# Patient Record
Sex: MALE | Race: WHITE | Employment: FULL TIME | URBAN - METROPOLITAN AREA
[De-identification: names, ages, dates, MRNs, and addresses within clinical notes are randomized per-mention and may not be internally consistent; named-entity substitution may affect disease eponyms.]

---

## 2017-06-09 ENCOUNTER — HOSPITAL ENCOUNTER (OUTPATIENT)
Dept: GENERAL RADIOLOGY | Age: 52
Discharge: HOME | End: 2017-06-09
Attending: ORTHOPAEDIC SURGERY
Payer: OTHER MISCELLANEOUS

## 2017-06-09 DIAGNOSIS — M76.892 TENDINITIS OF LEFT KNEE: ICD-10-CM

## 2017-06-09 DIAGNOSIS — M16.12 PRIMARY OSTEOARTHRITIS OF LEFT HIP: ICD-10-CM

## 2017-06-09 PROCEDURE — 74011250636 HC RX REV CODE- 250/636: Performed by: RADIOLOGY

## 2017-06-09 PROCEDURE — 74011636320 HC RX REV CODE- 636/320: Performed by: RADIOLOGY

## 2017-06-09 PROCEDURE — 74011000250 HC RX REV CODE- 250: Performed by: RADIOLOGY

## 2017-06-09 PROCEDURE — 20610 DRAIN/INJ JOINT/BURSA W/O US: CPT

## 2017-06-09 RX ORDER — TRIAMCINOLONE ACETONIDE 40 MG/ML
40 INJECTION, SUSPENSION INTRA-ARTICULAR; INTRAMUSCULAR
Status: COMPLETED | OUTPATIENT
Start: 2017-06-09 | End: 2017-06-09

## 2017-06-09 RX ORDER — BUPIVACAINE HYDROCHLORIDE 5 MG/ML
5 INJECTION, SOLUTION EPIDURAL; INTRACAUDAL
Status: COMPLETED | OUTPATIENT
Start: 2017-06-09 | End: 2017-06-09

## 2017-06-09 RX ADMIN — BUPIVACAINE HYDROCHLORIDE 25 MG: 5 INJECTION, SOLUTION EPIDURAL; INTRACAUDAL; PERINEURAL at 10:00

## 2017-06-09 RX ADMIN — IOHEXOL 20 ML: 180 INJECTION INTRAVENOUS at 10:00

## 2017-06-09 RX ADMIN — TRIAMCINOLONE ACETONIDE 40 MG: 40 INJECTION, SUSPENSION INTRA-ARTICULAR; INTRAMUSCULAR at 10:00

## 2017-07-14 ENCOUNTER — HOSPITAL ENCOUNTER (OUTPATIENT)
Dept: GENERAL RADIOLOGY | Age: 52
Discharge: HOME | End: 2017-07-14
Payer: SELF-PAY

## 2017-07-14 DIAGNOSIS — R06.02 SHORTNESS OF BREATH: ICD-10-CM

## 2017-07-14 PROCEDURE — 71020 XR CHEST PA LAT: CPT

## 2017-11-13 ENCOUNTER — HOSPITAL ENCOUNTER (OUTPATIENT)
Dept: NUCLEAR MEDICINE | Age: 52
Discharge: HOME | End: 2017-11-13
Attending: UROLOGY
Payer: SELF-PAY

## 2017-11-13 DIAGNOSIS — N13.30 HYDRONEPHROSIS, LEFT: ICD-10-CM

## 2017-11-13 PROCEDURE — 78708 K FLOW/FUNCT IMAGE W/DRUG: CPT

## 2017-11-13 PROCEDURE — 74011250636 HC RX REV CODE- 250/636

## 2017-11-13 RX ORDER — FUROSEMIDE 10 MG/ML
INJECTION INTRAMUSCULAR; INTRAVENOUS
Status: COMPLETED
Start: 2017-11-13 | End: 2017-11-13

## 2017-11-13 RX ORDER — FUROSEMIDE 10 MG/ML
20 INJECTION INTRAMUSCULAR; INTRAVENOUS ONCE
Status: COMPLETED | OUTPATIENT
Start: 2017-11-13 | End: 2017-11-13

## 2017-11-13 RX ADMIN — FUROSEMIDE 20 MG: 10 INJECTION INTRAMUSCULAR; INTRAVENOUS at 11:54

## 2017-11-13 RX ADMIN — FUROSEMIDE 20 MG: 10 INJECTION, SOLUTION INTRAMUSCULAR; INTRAVENOUS at 11:54

## 2020-02-19 ENCOUNTER — OFFICE VISIT (OUTPATIENT)
Dept: SURGERY | Age: 55
End: 2020-02-19

## 2020-02-19 VITALS
BODY MASS INDEX: 36.28 KG/M2 | HEIGHT: 68 IN | TEMPERATURE: 98.8 F | WEIGHT: 239.4 LBS | OXYGEN SATURATION: 97 % | HEART RATE: 74 BPM | DIASTOLIC BLOOD PRESSURE: 99 MMHG | RESPIRATION RATE: 24 BRPM | SYSTOLIC BLOOD PRESSURE: 158 MMHG

## 2020-02-19 DIAGNOSIS — K43.9 VENTRAL HERNIA WITHOUT OBSTRUCTION OR GANGRENE: Primary | ICD-10-CM

## 2020-02-19 PROBLEM — E66.09 CLASS 2 OBESITY DUE TO EXCESS CALORIES WITH BODY MASS INDEX (BMI) OF 36.0 TO 36.9 IN ADULT: Status: ACTIVE | Noted: 2020-02-19

## 2020-02-19 PROBLEM — K43.6 VENTRAL HERNIA WITH OBSTRUCTION AND WITHOUT GANGRENE: Status: ACTIVE | Noted: 2020-02-19

## 2020-02-19 RX ORDER — IBUPROFEN 800 MG/1
TABLET ORAL
COMMUNITY
Start: 2020-02-17

## 2020-02-19 RX ORDER — TRAZODONE HYDROCHLORIDE 50 MG/1
TABLET ORAL
COMMUNITY
Start: 2020-02-17

## 2020-02-19 RX ORDER — ZOLPIDEM TARTRATE 5 MG/1
TABLET ORAL
COMMUNITY
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
## 2020-02-26 ENCOUNTER — HOSPITAL ENCOUNTER (OUTPATIENT)
Dept: CT IMAGING | Age: 55
Discharge: HOME | End: 2020-02-26
Attending: SURGERY
Payer: COMMERCIAL

## 2020-02-26 DIAGNOSIS — K43.9 VENTRAL HERNIA WITHOUT OBSTRUCTION OR GANGRENE: ICD-10-CM

## 2020-02-26 LAB — CREAT BLD-MCNC: 1 MG/DL (ref 0.6–1.3)

## 2020-02-26 PROCEDURE — 82565 ASSAY OF CREATININE: CPT

## 2020-02-26 PROCEDURE — 74177 CT ABD & PELVIS W/CONTRAST: CPT

## 2020-02-26 PROCEDURE — 74011636320 HC RX REV CODE- 636/320: Performed by: SURGERY

## 2020-02-26 RX ORDER — BARIUM SULFATE 20 MG/ML
900 SUSPENSION ORAL
Status: DISCONTINUED | OUTPATIENT
Start: 2020-02-26 | End: 2020-02-26

## 2020-02-26 RX ORDER — SODIUM CHLORIDE 0.9 % (FLUSH) 0.9 %
10 SYRINGE (ML) INJECTION
Status: COMPLETED | OUTPATIENT
Start: 2020-02-26 | End: 2020-02-26

## 2020-02-26 RX ADMIN — Medication 10 ML: at 06:56

## 2020-02-26 RX ADMIN — IOPAMIDOL 100 ML: 755 INJECTION, SOLUTION INTRAVENOUS at 06:56

## 2020-03-11 ENCOUNTER — TELEPHONE (OUTPATIENT)
Dept: SURGERY | Age: 55
End: 2020-03-11

## 2020-03-11 NOTE — TELEPHONE ENCOUNTER
Patient wants results of his ct scan, he also wants to schedule surgery as soon as possible.  He needs to make plans at work,etc.

## 2020-03-12 ENCOUNTER — TELEPHONE (OUTPATIENT)
Dept: SURGERY | Age: 55
End: 2020-03-12

## 2020-03-12 NOTE — TELEPHONE ENCOUNTER
Surgery    CT reviewed, showing multiple small ventral hernias. I spoke with the patient. He will likely need robotic repair. I will discuss repair with one of my associates.     Tuyet Hidalgo MD

## 2020-03-13 ENCOUNTER — TELEPHONE (OUTPATIENT)
Dept: SURGERY | Age: 55
End: 2020-03-13

## 2020-03-13 NOTE — TELEPHONE ENCOUNTER
I reviewed CT with Dr Nella Verdugo who will contact patient regarding robotic repair of multiple incisional hernias.     Tuyet Hidalgo MD

## 2020-03-17 ENCOUNTER — TELEPHONE (OUTPATIENT)
Dept: SURGERY | Age: 55
End: 2020-03-17

## 2020-03-17 NOTE — TELEPHONE ENCOUNTER
Called patient to discuss his hernias and the current hold on elective surgeries due to coronavirus. He was understanding and dates that his discomfort is tolerable. He would like to schedule elective surgery when it is allowed. I told him that we will contact him when the restrictions are lifted. I also told him to call us for worsening symptoms in the meantime.

## 2020-05-14 ENCOUNTER — TELEPHONE (OUTPATIENT)
Dept: SURGERY | Age: 55
End: 2020-05-14

## 2020-05-15 NOTE — TELEPHONE ENCOUNTER
Surgery Instruction Sheet    You have been scheduled for surgery on Thursday 05/28/20 at 7:30 am at [facility]. Please report to the Surgery Center at 5:45 am, this is approximately 2 hours prior to your surgery time. The Surgery Center is located on the [facility] [address] [facility] [address] side of the hospital, just next to the Emergency Room. Reserved parking is available and  parking if lot is full. You will need to have a Pre-op Visit prior to your surgery. Report to the Surgery Center on Friday 05/22/20 at 10:00 am.  Bring a list of medications and your insurance cards with you. You may eat/drink prior to this visit. Call your physician immediately if you notice a change in your health between the time you saw your physician and the day of surgery. If you take a blood thinner, please let us know. Call your ordering Doctor to make sure you can stop taking it prior to your surgery. STOP YOUR ASPIRIN 5 DAYS PRIOR TO SURGERY. DO NOT TAKE  IBUPROFEN, ADVIL, MOTRIN, ALEVE, EXCEDRIN, BC POWDER, GOODIES, FISH OIL OR ANY MEDICATION CONTAINING ASPIRIN 10 DAYS PRIOR TO YOUR SURGERY. MAY TAKE TYLENOL. Eat a light dinner the evening before your surgery. DO NOT EAT OR DRINK ANYTHING AFTER MIDNIGHT THE NIGHT BEFORE YOUR SURGERY. This includes water, chewing gum, lifesavers, etc.  The Pre op nurse will check with you about any medication that you may need to take the morning of surgery. Shower with a new bar of anti-bacterial soap (Dial, Safeguard) or solution given to you by Pre-op, the night before surgery. Do not use lotion, powder, deodorant on the skin after showering.   Wear loose, comfortable clothing the day of surgery and bring a container to store your contacts, eyeglasses, dentures, hearing aid, etc.  Do not bring money, valuables, jewelry, etc. to the hospital.      If you are having outpatient surgery, someone must come with you the morning of surgery to drive you home. You can not drive for 24 hours after any anesthesia. Sometimes it is necessary to stay overnight and leave the next morning. This is still considered outpatient for most insurance deductibles. Someone will still need to drive you home. If you have questions or concerns, please feel free to call Dr Melly Maldonado at [phone]. If you need to cancel your surgery, please call as soon as possible.

## 2020-05-20 ENCOUNTER — DOCUMENTATION ONLY (OUTPATIENT)
Dept: SURGERY | Age: 55
End: 2020-05-20

## 2020-05-20 NOTE — PROGRESS NOTES
Spoke with patient informed him DOS 05/28/20 @ 0730 with [phone] arrival & PAT 05/22/20 @ 10:00 am, arrive to [address] for both. Express understanding.

## 2020-05-22 ENCOUNTER — HOSPITAL ENCOUNTER (OUTPATIENT)
Dept: PREADMISSION TESTING | Age: 55
Discharge: HOME | End: 2020-05-22
Attending: SURGERY
Payer: MEDICAID

## 2020-05-22 VITALS
DIASTOLIC BLOOD PRESSURE: 107 MMHG | TEMPERATURE: 98.2 F | HEIGHT: 68 IN | WEIGHT: 249.56 LBS | SYSTOLIC BLOOD PRESSURE: 152 MMHG | BODY MASS INDEX: 37.82 KG/M2 | OXYGEN SATURATION: 98 % | HEART RATE: 70 BPM

## 2020-05-22 LAB
ANION GAP SERPL CALC-SCNC: 2 MMOL/L (ref 5–15)
ATRIAL RATE: 75 BPM
BUN SERPL-MCNC: 19 MG/DL (ref 6–20)
BUN/CREAT SERPL: 18 (ref 12–20)
CALCIUM SERPL-MCNC: 9.6 MG/DL (ref 8.5–10.1)
CALCULATED P AXIS, ECG09: 58 DEGREES
CALCULATED R AXIS, ECG10: 25 DEGREES
CALCULATED T AXIS, ECG11: 31 DEGREES
CHLORIDE SERPL-SCNC: 103 MMOL/L (ref 97–108)
CO2 SERPL-SCNC: 32 MMOL/L (ref 21–32)
CREAT SERPL-MCNC: 1.03 MG/DL (ref 0.7–1.3)
DIAGNOSIS, 93000: NORMAL
ERYTHROCYTE [DISTWIDTH] IN BLOOD BY AUTOMATED COUNT: 13.5 % (ref 11.5–14.5)
GLUCOSE SERPL-MCNC: 113 MG/DL (ref 65–100)
HCT VFR BLD AUTO: 46.2 % (ref 36.6–50.3)
HGB BLD-MCNC: 15.9 G/DL (ref 12.1–17)
MCH RBC QN AUTO: 30.6 PG (ref 26–34)
MCHC RBC AUTO-ENTMCNC: 34.4 G/DL (ref 30–36.5)
MCV RBC AUTO: 88.8 FL (ref 80–99)
NRBC # BLD: 0 K/UL (ref 0–0.01)
NRBC BLD-RTO: 0 PER 100 WBC
P-R INTERVAL, ECG05: 136 MS
PLATELET # BLD AUTO: 213 K/UL (ref 150–400)
PMV BLD AUTO: 9.6 FL (ref 8.9–12.9)
POTASSIUM SERPL-SCNC: 3.9 MMOL/L (ref 3.5–5.1)
Q-T INTERVAL, ECG07: 372 MS
QRS DURATION, ECG06: 76 MS
QTC CALCULATION (BEZET), ECG08: 415 MS
RBC # BLD AUTO: 5.2 M/UL (ref 4.1–5.7)
SODIUM SERPL-SCNC: 137 MMOL/L (ref 136–145)
VENTRICULAR RATE, ECG03: 75 BPM
WBC # BLD AUTO: 6 K/UL (ref 4.1–11.1)

## 2020-05-22 PROCEDURE — 93005 ELECTROCARDIOGRAM TRACING: CPT

## 2020-05-22 PROCEDURE — 85027 COMPLETE CBC AUTOMATED: CPT

## 2020-05-22 PROCEDURE — 80048 BASIC METABOLIC PNL TOTAL CA: CPT

## 2020-05-22 PROCEDURE — 36415 COLL VENOUS BLD VENIPUNCTURE: CPT

## 2020-05-22 RX ORDER — SODIUM CHLORIDE, SODIUM LACTATE, POTASSIUM CHLORIDE, CALCIUM CHLORIDE 600; 310; 30; 20 MG/100ML; MG/100ML; MG/100ML; MG/100ML
25 INJECTION, SOLUTION INTRAVENOUS CONTINUOUS
Status: CANCELLED | OUTPATIENT
Start: 2020-05-28

## 2020-05-22 NOTE — PERIOP NOTES
[facility]  Preoperative Instructions        Surgery Date 5/28/20          Time of Arrival 5:45am    1. On the day of your surgery, please report to the Surgical Services Registration Desk and sign in at your designated time. The Surgery Center is located to the right of the Emergency Room. 2. You must have someone with you to drive you home. You should not drive a car for 24 hours following surgery. Please make arrangements for a friend or family member to stay with you for the first 24 hours after your surgery. 3. Do not have anything to eat or drink (including water, gum, mints, coffee, juice) after midnight  5/27/20. ? This may not apply to medications prescribed by your physician. ?(Please note below the special instructions with medications to take the morning of your procedure.)    4. We recommend you do not drink any alcoholic beverages for 24 hours before and after your surgery. 5. Contact your surgeons office for instructions on the following medications: non-steroidal anti-inflammatory drugs (i.e. Advil, Aleve), vitamins, and supplements. (Some surgeons will want you to stop these medications prior to surgery and others may allow you to take them)  **If you are currently taking Plavix, Coumadin, Aspirin and/or other blood-thinning agents, contact your surgeon for instructions. ** Your surgeon will partner with the physician prescribing these medications to determine if it is safe to stop or if you need to continue taking. Please do not stop taking these medications without instructions from your surgeon    6. Wear comfortable clothes. Wear glasses instead of contacts. Do not bring any money or jewelry. Please bring picture ID, insurance card, and any prearranged co-payment or hospital payment. Do not wear make-up, particularly mascara the morning of your surgery. Do not wear nail polish, particularly if you are having foot /hand surgery.   Wear your hair loose or down, no ponytails, buns, christiano pins or clips. All body piercings must be removed. Please shower with antibacterial soap for three consecutive days before and on the morning of surgery, but do not apply any lotions, powders or deodorants after the shower on the day of surgery. Please use a fresh towels after each shower. Please sleep in clean clothes and change bed linens the night before surgery. Please do not shave for 48 hours prior to surgery. Shaving of the face is acceptable. 7. You should understand that if you do not follow these instructions your surgery may be cancelled. If your physical condition changes (I.e. fever, cold or flu) please contact your surgeon as soon as possible. 8. It is important that you be on time. If a situation occurs where you may be late, please call [phone] (OR Holding Area). 9. If you have any questions and or problems, please call [phone] (Pre-admission Testing). 10. Your surgery time may be subject to change. You will receive a phone call the evening prior if your time changes. 11.  If having outpatient surgery, you must have someone to drive you here, stay with you during the duration of your stay, and to drive you home at time of discharge. 12.   In an effort to improve the efficiency, privacy, and safety for all of our Pre-op patients visitors are not allowed in the Holding area. Once you arrive and are registered your family/visitors will be asked to remain in your vehicle. The Pre-op staff will call you when they are ready for you to enter the building and will explain to you and your family/visitors that the Pre-op phase is beginning. At this time, if your procedure is outpatient, your family member will be asked to stay in their vehicle until the surgery is complete and you are ready for discharge.  If you are going to be admitted after your surgery, once you are called to come inside the building, your family will be able to leave the parking lot. At this time the staff will also ask for your designated spokesperson information in the event that the physician or staff need to provide an update or obtain any pertinent information. The designated spokesperson will be notified if the physician needs to speak to family during the pre-operative phase.and/or in the post op phase. Special Instructions:     TAKE ALL MEDICATIONS DAY OF SURGERY EXCEPT: none      I understand a pre-operative phone call will be made to verify my surgery time. In the event that I am not available, I give permission for a message to be left on my answering service and/or with another person?   Yes [phone]         ___________________      __________   _________    (Signature of Patient)             (Witness)                (Date and Time)

## 2020-05-24 ENCOUNTER — HOSPITAL ENCOUNTER (OUTPATIENT)
Dept: PREADMISSION TESTING | Age: 55
Discharge: HOME | End: 2020-05-24
Payer: MEDICAID

## 2020-05-24 PROCEDURE — 87635 SARS-COV-2 COVID-19 AMP PRB: CPT

## 2020-05-25 LAB
SARS-COV-2, COV2NT: NOT DETECTED
SPECIMEN SOURCE, FCOV2M: NORMAL

## 2020-05-28 ENCOUNTER — HOSPITAL ENCOUNTER (OUTPATIENT)
Age: 55
Setting detail: OUTPATIENT SURGERY
Discharge: HOME | End: 2020-05-28
Attending: SURGERY | Admitting: SURGERY
Payer: MEDICAID

## 2020-05-28 ENCOUNTER — ANESTHESIA EVENT (OUTPATIENT)
Dept: SURGERY | Age: 55
End: 2020-05-28
Payer: MEDICAID

## 2020-05-28 ENCOUNTER — ANESTHESIA (OUTPATIENT)
Dept: SURGERY | Age: 55
End: 2020-05-28
Payer: MEDICAID

## 2020-05-28 VITALS
SYSTOLIC BLOOD PRESSURE: 142 MMHG | WEIGHT: 244.71 LBS | TEMPERATURE: 98 F | HEART RATE: 82 BPM | DIASTOLIC BLOOD PRESSURE: 75 MMHG | OXYGEN SATURATION: 95 % | RESPIRATION RATE: 16 BRPM | HEIGHT: 68 IN | BODY MASS INDEX: 37.09 KG/M2

## 2020-05-28 DIAGNOSIS — K43.2 INCISIONAL HERNIA, WITHOUT OBSTRUCTION OR GANGRENE: Primary | ICD-10-CM

## 2020-05-28 PROCEDURE — 77030040361 HC SLV COMPR DVT MDII -B: Performed by: SURGERY

## 2020-05-28 PROCEDURE — 74011000250 HC RX REV CODE- 250: Performed by: SURGERY

## 2020-05-28 PROCEDURE — 77030008608 HC TRCR ENDOSC SMTH AMR -B: Performed by: SURGERY

## 2020-05-28 PROCEDURE — 77030020703 HC SEAL CANN DISP INTU -B: Performed by: SURGERY

## 2020-05-28 PROCEDURE — 74011000250 HC RX REV CODE- 250: Performed by: NURSE ANESTHETIST, CERTIFIED REGISTERED

## 2020-05-28 PROCEDURE — 77030026438 HC STYL ET INTUB CARD -A: Performed by: NURSE ANESTHETIST, CERTIFIED REGISTERED

## 2020-05-28 PROCEDURE — 74011250636 HC RX REV CODE- 250/636: Performed by: NURSE ANESTHETIST, CERTIFIED REGISTERED

## 2020-05-28 PROCEDURE — 76060000034 HC ANESTHESIA 1.5 TO 2 HR: Performed by: SURGERY

## 2020-05-28 PROCEDURE — 76010000875 HC OR TIME 1.5 TO 2HR INTENSV - TIER 2: Performed by: SURGERY

## 2020-05-28 PROCEDURE — 74011250636 HC RX REV CODE- 250/636: Performed by: ANESTHESIOLOGY

## 2020-05-28 PROCEDURE — 77030040922 HC BLNKT HYPOTHRM STRY -A

## 2020-05-28 PROCEDURE — 77030002933 HC SUT MCRYL J&J -A: Performed by: SURGERY

## 2020-05-28 PROCEDURE — 77030036554: Performed by: SURGERY

## 2020-05-28 PROCEDURE — 77030012770 HC TRCR OPT FX AMR -B: Performed by: SURGERY

## 2020-05-28 PROCEDURE — 76210000021 HC REC RM PH II 0.5 TO 1 HR: Performed by: SURGERY

## 2020-05-28 PROCEDURE — 77030035277 HC OBTRTR BLDELSS DISP INTU -B: Performed by: SURGERY

## 2020-05-28 PROCEDURE — 77030003581 HC NDL INSUF VERES COVD -B: Performed by: SURGERY

## 2020-05-28 PROCEDURE — 74011250637 HC RX REV CODE- 250/637: Performed by: SURGERY

## 2020-05-28 PROCEDURE — 77030002895 HC DEV VASC CLOSR COVD -B: Performed by: SURGERY

## 2020-05-28 PROCEDURE — 77030018673: Performed by: SURGERY

## 2020-05-28 PROCEDURE — 77030010507 HC ADH SKN DERMBND J&J -B: Performed by: SURGERY

## 2020-05-28 PROCEDURE — 76210000017 HC OR PH I REC 1.5 TO 2 HR: Performed by: SURGERY

## 2020-05-28 PROCEDURE — 77030018846 HC SOL IRR STRL H20 ICUM -A: Performed by: SURGERY

## 2020-05-28 PROCEDURE — 77030031139 HC SUT VCRL2 J&J -A: Performed by: SURGERY

## 2020-05-28 PROCEDURE — 74011000272 HC RX REV CODE- 272: Performed by: SURGERY

## 2020-05-28 PROCEDURE — 77030011640 HC PAD GRND REM COVD -A: Performed by: SURGERY

## 2020-05-28 PROCEDURE — 77030008684 HC TU ET CUF COVD -B: Performed by: NURSE ANESTHETIST, CERTIFIED REGISTERED

## 2020-05-28 PROCEDURE — 77030022704 HC SUT VLOC COVD -B: Performed by: SURGERY

## 2020-05-28 PROCEDURE — C1781 MESH (IMPLANTABLE): HCPCS | Performed by: SURGERY

## 2020-05-28 PROCEDURE — 77030016151 HC PROTCTR LNS DFOG COVD -B: Performed by: SURGERY

## 2020-05-28 DEVICE — MESH SURG DIA9CM WHT POLY CLLGN FLM RND MFIL BIOABSRB: Type: IMPLANTABLE DEVICE | Site: ABDOMEN | Status: FUNCTIONAL

## 2020-05-28 RX ORDER — ONDANSETRON 2 MG/ML
INJECTION INTRAMUSCULAR; INTRAVENOUS AS NEEDED
Status: DISCONTINUED | OUTPATIENT
Start: 2020-05-28 | End: 2020-05-28 | Stop reason: HOSPADM

## 2020-05-28 RX ORDER — SODIUM CHLORIDE, SODIUM LACTATE, POTASSIUM CHLORIDE, CALCIUM CHLORIDE 600; 310; 30; 20 MG/100ML; MG/100ML; MG/100ML; MG/100ML
25 INJECTION, SOLUTION INTRAVENOUS CONTINUOUS
Status: DISCONTINUED | OUTPATIENT
Start: 2020-05-28 | End: 2020-05-28 | Stop reason: HOSPADM

## 2020-05-28 RX ORDER — SODIUM CHLORIDE 0.9 % (FLUSH) 0.9 %
5-40 SYRINGE (ML) INJECTION AS NEEDED
Status: DISCONTINUED | OUTPATIENT
Start: 2020-05-28 | End: 2020-05-28 | Stop reason: HOSPADM

## 2020-05-28 RX ORDER — CEFAZOLIN SODIUM 1 G/3ML
INJECTION, POWDER, FOR SOLUTION INTRAMUSCULAR; INTRAVENOUS AS NEEDED
Status: DISCONTINUED | OUTPATIENT
Start: 2020-05-28 | End: 2020-05-28 | Stop reason: HOSPADM

## 2020-05-28 RX ORDER — SODIUM CHLORIDE, SODIUM LACTATE, POTASSIUM CHLORIDE, CALCIUM CHLORIDE 600; 310; 30; 20 MG/100ML; MG/100ML; MG/100ML; MG/100ML
INJECTION, SOLUTION INTRAVENOUS
Status: DISCONTINUED | OUTPATIENT
Start: 2020-05-28 | End: 2020-05-28 | Stop reason: HOSPADM

## 2020-05-28 RX ORDER — NEOSTIGMINE METHYLSULFATE 1 MG/ML
INJECTION, SOLUTION INTRAVENOUS AS NEEDED
Status: DISCONTINUED | OUTPATIENT
Start: 2020-05-28 | End: 2020-05-28 | Stop reason: HOSPADM

## 2020-05-28 RX ORDER — MORPHINE SULFATE 10 MG/ML
2 INJECTION, SOLUTION INTRAMUSCULAR; INTRAVENOUS
Status: DISCONTINUED | OUTPATIENT
Start: 2020-05-28 | End: 2020-05-28 | Stop reason: HOSPADM

## 2020-05-28 RX ORDER — FENTANYL CITRATE 50 UG/ML
25 INJECTION, SOLUTION INTRAMUSCULAR; INTRAVENOUS
Status: DISCONTINUED | OUTPATIENT
Start: 2020-05-28 | End: 2020-05-28 | Stop reason: HOSPADM

## 2020-05-28 RX ORDER — ONDANSETRON 2 MG/ML
4 INJECTION INTRAMUSCULAR; INTRAVENOUS AS NEEDED
Status: DISCONTINUED | OUTPATIENT
Start: 2020-05-28 | End: 2020-05-28 | Stop reason: HOSPADM

## 2020-05-28 RX ORDER — IBUPROFEN 800 MG/1
800 TABLET ORAL
Qty: 21 TAB | Refills: 0 | Status: SHIPPED | OUTPATIENT
Start: 2020-05-28

## 2020-05-28 RX ORDER — EPHEDRINE SULFATE/0.9% NACL/PF 50 MG/5 ML
SYRINGE (ML) INTRAVENOUS AS NEEDED
Status: DISCONTINUED | OUTPATIENT
Start: 2020-05-28 | End: 2020-05-28 | Stop reason: HOSPADM

## 2020-05-28 RX ORDER — POLYETHYLENE GLYCOL 3350 17 G/17G
17 POWDER, FOR SOLUTION ORAL DAILY
Qty: 510 G | Refills: 0 | Status: SHIPPED | OUTPATIENT
Start: 2020-05-28

## 2020-05-28 RX ORDER — KETAMINE HYDROCHLORIDE 100 MG/ML
INJECTION, SOLUTION INTRAMUSCULAR; INTRAVENOUS AS NEEDED
Status: DISCONTINUED | OUTPATIENT
Start: 2020-05-28 | End: 2020-05-28 | Stop reason: HOSPADM

## 2020-05-28 RX ORDER — KETOROLAC TROMETHAMINE 30 MG/ML
INJECTION, SOLUTION INTRAMUSCULAR; INTRAVENOUS AS NEEDED
Status: DISCONTINUED | OUTPATIENT
Start: 2020-05-28 | End: 2020-05-28 | Stop reason: HOSPADM

## 2020-05-28 RX ORDER — DIPHENHYDRAMINE HYDROCHLORIDE 50 MG/ML
12.5 INJECTION, SOLUTION INTRAMUSCULAR; INTRAVENOUS AS NEEDED
Status: DISCONTINUED | OUTPATIENT
Start: 2020-05-28 | End: 2020-05-28 | Stop reason: HOSPADM

## 2020-05-28 RX ORDER — HYDROMORPHONE HYDROCHLORIDE 2 MG/ML
INJECTION, SOLUTION INTRAMUSCULAR; INTRAVENOUS; SUBCUTANEOUS AS NEEDED
Status: DISCONTINUED | OUTPATIENT
Start: 2020-05-28 | End: 2020-05-28 | Stop reason: HOSPADM

## 2020-05-28 RX ORDER — PROPOFOL 10 MG/ML
INJECTION, EMULSION INTRAVENOUS AS NEEDED
Status: DISCONTINUED | OUTPATIENT
Start: 2020-05-28 | End: 2020-05-28 | Stop reason: HOSPADM

## 2020-05-28 RX ORDER — FENTANYL CITRATE 50 UG/ML
INJECTION, SOLUTION INTRAMUSCULAR; INTRAVENOUS AS NEEDED
Status: DISCONTINUED | OUTPATIENT
Start: 2020-05-28 | End: 2020-05-28 | Stop reason: HOSPADM

## 2020-05-28 RX ORDER — LIDOCAINE HYDROCHLORIDE 20 MG/ML
INJECTION, SOLUTION EPIDURAL; INFILTRATION; INTRACAUDAL; PERINEURAL AS NEEDED
Status: DISCONTINUED | OUTPATIENT
Start: 2020-05-28 | End: 2020-05-28 | Stop reason: HOSPADM

## 2020-05-28 RX ORDER — HYDROMORPHONE HYDROCHLORIDE 1 MG/ML
.2-.5 INJECTION, SOLUTION INTRAMUSCULAR; INTRAVENOUS; SUBCUTANEOUS
Status: DISCONTINUED | OUTPATIENT
Start: 2020-05-28 | End: 2020-05-28 | Stop reason: HOSPADM

## 2020-05-28 RX ORDER — ROCURONIUM BROMIDE 10 MG/ML
INJECTION, SOLUTION INTRAVENOUS AS NEEDED
Status: DISCONTINUED | OUTPATIENT
Start: 2020-05-28 | End: 2020-05-28 | Stop reason: HOSPADM

## 2020-05-28 RX ORDER — SODIUM CHLORIDE 0.9 % (FLUSH) 0.9 %
5-40 SYRINGE (ML) INJECTION EVERY 8 HOURS
Status: DISCONTINUED | OUTPATIENT
Start: 2020-05-28 | End: 2020-05-28 | Stop reason: HOSPADM

## 2020-05-28 RX ORDER — SUCCINYLCHOLINE CHLORIDE 20 MG/ML
INJECTION INTRAMUSCULAR; INTRAVENOUS AS NEEDED
Status: DISCONTINUED | OUTPATIENT
Start: 2020-05-28 | End: 2020-05-28 | Stop reason: HOSPADM

## 2020-05-28 RX ORDER — HYDROCODONE BITARTRATE AND ACETAMINOPHEN 5; 325 MG/1; MG/1
1-2 TABLET ORAL
Qty: 30 TAB | Refills: 0 | Status: SHIPPED | OUTPATIENT
Start: 2020-05-28 | End: 2020-06-02

## 2020-05-28 RX ORDER — DEXAMETHASONE SODIUM PHOSPHATE 4 MG/ML
INJECTION, SOLUTION INTRA-ARTICULAR; INTRALESIONAL; INTRAMUSCULAR; INTRAVENOUS; SOFT TISSUE AS NEEDED
Status: DISCONTINUED | OUTPATIENT
Start: 2020-05-28 | End: 2020-05-28 | Stop reason: HOSPADM

## 2020-05-28 RX ADMIN — PROPOFOL 200 MG: 10 INJECTION, EMULSION INTRAVENOUS at 08:01

## 2020-05-28 RX ADMIN — ROCURONIUM BROMIDE 10 MG: 10 INJECTION INTRAVENOUS at 08:01

## 2020-05-28 RX ADMIN — SUCCINYLCHOLINE CHLORIDE 100 MG: 20 INJECTION, SOLUTION INTRAMUSCULAR; INTRAVENOUS at 08:01

## 2020-05-28 RX ADMIN — LIDOCAINE HYDROCHLORIDE 100 MG: 20 INJECTION, SOLUTION INTRAVENOUS at 08:01

## 2020-05-28 RX ADMIN — FENTANYL CITRATE 25 MCG: 50 INJECTION, SOLUTION INTRAMUSCULAR; INTRAVENOUS at 10:43

## 2020-05-28 RX ADMIN — SODIUM CHLORIDE, POTASSIUM CHLORIDE, SODIUM LACTATE AND CALCIUM CHLORIDE: 600; 310; 30; 20 INJECTION, SOLUTION INTRAVENOUS at 07:54

## 2020-05-28 RX ADMIN — KETAMINE HYDROCHLORIDE 25 MG: 100 INJECTION, SOLUTION, CONCENTRATE INTRAMUSCULAR; INTRAVENOUS at 08:01

## 2020-05-28 RX ADMIN — Medication 25 MG: at 08:13

## 2020-05-28 RX ADMIN — KETOROLAC TROMETHAMINE 30 MG: 30 INJECTION, SOLUTION INTRAMUSCULAR; INTRAVENOUS at 09:19

## 2020-05-28 RX ADMIN — FENTANYL CITRATE 50 MCG: 50 INJECTION, SOLUTION INTRAMUSCULAR; INTRAVENOUS at 08:00

## 2020-05-28 RX ADMIN — ONDANSETRON HYDROCHLORIDE 4 MG: 2 INJECTION, SOLUTION INTRAMUSCULAR; INTRAVENOUS at 08:38

## 2020-05-28 RX ADMIN — FENTANYL CITRATE 50 MCG: 50 INJECTION, SOLUTION INTRAMUSCULAR; INTRAVENOUS at 07:54

## 2020-05-28 RX ADMIN — ONDANSETRON 4 MG: 2 INJECTION INTRAMUSCULAR; INTRAVENOUS at 10:29

## 2020-05-28 RX ADMIN — ROCURONIUM BROMIDE 40 MG: 10 INJECTION INTRAVENOUS at 08:10

## 2020-05-28 RX ADMIN — HYDROMORPHONE HYDROCHLORIDE 1 MG: 2 INJECTION, SOLUTION INTRAMUSCULAR; INTRAVENOUS; SUBCUTANEOUS at 08:01

## 2020-05-28 RX ADMIN — CEFAZOLIN 2 G: 1 INJECTION, POWDER, FOR SOLUTION INTRAMUSCULAR; INTRAVENOUS; PARENTERAL at 08:09

## 2020-05-28 RX ADMIN — KETAMINE HYDROCHLORIDE 25 MG: 100 INJECTION, SOLUTION, CONCENTRATE INTRAMUSCULAR; INTRAVENOUS at 07:56

## 2020-05-28 RX ADMIN — Medication 3 AMPULE: at 06:30

## 2020-05-28 RX ADMIN — SODIUM CHLORIDE, POTASSIUM CHLORIDE, SODIUM LACTATE AND CALCIUM CHLORIDE 25 ML/HR: 600; 310; 30; 20 INJECTION, SOLUTION INTRAVENOUS at 06:30

## 2020-05-28 RX ADMIN — FENTANYL CITRATE 25 MCG: 50 INJECTION, SOLUTION INTRAMUSCULAR; INTRAVENOUS at 10:10

## 2020-05-28 RX ADMIN — Medication 3 MG: at 09:25

## 2020-05-28 RX ADMIN — Medication 25 MG: at 08:10

## 2020-05-28 RX ADMIN — FENTANYL CITRATE 25 MCG: 50 INJECTION, SOLUTION INTRAMUSCULAR; INTRAVENOUS at 10:05

## 2020-05-28 RX ADMIN — FENTANYL CITRATE 25 MCG: 50 INJECTION, SOLUTION INTRAMUSCULAR; INTRAVENOUS at 10:20

## 2020-05-28 RX ADMIN — DIPHENHYDRAMINE HYDROCHLORIDE 12.5 MG: 50 INJECTION, SOLUTION INTRAMUSCULAR; INTRAVENOUS at 10:08

## 2020-05-28 RX ADMIN — HYDROMORPHONE HYDROCHLORIDE 1 MG: 2 INJECTION, SOLUTION INTRAMUSCULAR; INTRAVENOUS; SUBCUTANEOUS at 07:58

## 2020-05-28 RX ADMIN — DEXAMETHASONE SODIUM PHOSPHATE 4 MG: 4 INJECTION, SOLUTION INTRAMUSCULAR; INTRAVENOUS at 08:38

## 2020-05-28 NOTE — H&P
Day of Surgery H&P    Assessment:   NCISIONAL HERNIA POSSIBLE LEFT SPIGELIAN HERNIA    Body mass index is 37.76 kg/m². Plan:   ROBOTIC REPAIR INCISIONAL HERNIA, POSSIBLE LEFT SPIGELIAN HERNIA WITH MESH (N/A Abdomen) Discussed the risk of surgery including bleeding, infection, injury to adjacent structures, and the risks of general anesthetic. The patient understands the risks; any and all questions were answered to the patient's satisfaction. Subjective:      Jeoffrey Mortimer is a 54 y.o. male who presents for above procedure. Objective:   Blood pressure 166/84, pulse 79, temperature 98.5 °F (36.9 °C), resp. rate 15, height 5' 7.5\" (1.715 m), weight 111 kg (244 lb 11.4 oz), SpO2 99 %.   Temp (24hrs), Av.5 °F (36.9 °C), Min:98.5 °F (36.9 °C), Max:98.5 °F (36.9 °C)      Physical Exam:  PHYSICAL EXAM:    Chest:   [x]   CTA bialterally, no wheezing/rhonchi/rales/crackles    []   wheezing     []   rhonchi     []   crackles     []   use of accessory muscles    Heart:  [x]   regular rate and rhythm     [x]   No murmurs/rubs/gallops    []   irregular rhythm     []   Murmur     []   Rubs     []   Gallops    Midline and left sided hernia     Past Medical History:   Diagnosis Date    Asthma     Sleep apnea     doesnt wear cpap     Past Surgical History:   Procedure Laterality Date    HX ORTHOPAEDIC  2019    hip/Dr Nancy Young UROLOGICAL  2019    kidney repair/Dr Gonzalez      Family History   Problem Relation Age of Onset    Cancer Mother         breast    Cancer Father         kidney cancer     Social History     Socioeconomic History    Marital status: SINGLE     Spouse name: Not on file    Number of children: Not on file    Years of education: Not on file    Highest education level: Not on file   Tobacco Use    Smoking status: Never Smoker    Smokeless tobacco: Never Used   Substance and Sexual Activity    Alcohol use: Not Currently     Comment: rarely    Drug use: Never      Prior to Admission medications    Medication Sig Start Date End Date Taking? Authorizing Provider   OTHER 1 Cap three (3) times daily. Ginger root   Yes Provider, Historical   ibuprofen (MOTRIN) 800 mg tablet  2/17/20  Yes Provider, Historical   traZODone (DESYREL) 50 mg tablet  2/17/20  Yes Provider, Historical   zolpidem (AMBIEN) 5 mg tablet Take  by mouth nightly as needed for Sleep. Yes Provider, Historical     ALLERGIES:    Allergies   Allergen Reactions    Percocet [Oxycodone-Acetaminophen] Other (comments)     Headache       Review of Systems:    See prior consult, office note or scanned list in media section. 10 systems negative except as specified.                   Signed By: Norris Borrego MD     May 28, 2020

## 2020-05-28 NOTE — BRIEF OP NOTE
[id number]  Brief Postoperative Note    Patient: Jett Shah  YOB: 1965  MRN: [id number]    Date of Procedure: 5/28/2020     Pre-Op Diagnosis: INCISIONAL HERNIA and TWO PORT SITE HERNIAs    Post-Op Diagnosis: Same as preoperative diagnosis. Procedure(s):  ROBOTIC REPAIR INCISIONAL HERNIA, TWO PORT SITE HERNIA's WITH MESH    Surgeon(s):  Adriel Michelle MD    Surgical Assistant: None    Anesthesia: General     Estimated Blood Loss (mL): Minimal    Complications: None    Specimens: * No specimens in log *     Implants:   Implant Name Type Inv. Item Serial No.  Lot No. LRB No. Used Action   MESH ANAND COMP RND 9CM -- SYMBOTEX - SN/A Mesh MESH ANAND COMP RND 9CM -- SYMBOTEX N/A [facility] ENDOMECHANICAL [id number] N/A 1 Implanted       Drains: * No LDAs found *    Findings: 2 left sided port site hernias, 1 periumbilical incisional hernia.       Electronically Signed by Bill Castle MD on 5/28/2020 at 9:24 AM

## 2020-05-28 NOTE — DISCHARGE INSTRUCTIONS
Discharge Instructions: Hernia -- Dr. Janeen Myers    Call on next business day to arrange appointment for follow up in 3 weeks -- [phone]. Activity:  Walk regularly. No lifting more than 10 pounds for 6 weeks. Light aerobic activity is okay when you feel up to it. You may resume driving in three days unless still requiring narcotics for pain. Return to work in 1-2 weeks but no heavy lifting for 6 weeks. Apply ice to areas of tenderness for 20 minutes at a time. Keep a cloth between the skin and the bag of ice. Work:  You may return to work in 1 or 2 weeks to light activity. No lifting more than 10 pounds (=\"light duty\") for 6 weeks. If your employer can not accommodate \"light duty,\" your employer will need to provide any necessary paperwork to our office. This document with serve as the initial \"note\" to your employer. Diet:  You may resume normal diet. Wound Care:  Glue dressing will fall off on its own. If you experience a lot of drainage, develop redness around the wound, or a fever over 101 F occurs please call the office. There will be significant swelling under the incision(s) that will develop over the next 3-4 days. Ice will help reduce this. Male patients who have inguinal hernia repairs may experience swelling and bruising of the scrotum -- this is normal.  However, if the scrotum becomes tense and painful you should call. Wear a jock strap/athletic supporter for the next week to reduce scrotal swelling. If you can't urinate within 8 hours, call. Intentionally urinate every 2 hours today, even if you don't feel like you have to go. For umbilical, ventral and incisional hernia repairs, wear your abdominal binder at all times for 3 weeks. May remove to shower. May wash binder. Wear a cotton T-shirt under the binder for comfort. Inguinal hernia repairs do not need a binder. You may shower. No baths or swimming for 3 weeks.     Medications:  See attached \"Medication Reconciliation. \"    Resume home medications as indicated on the Medical Reconciliation form. Do not use blood thinners (such as Aspirin, Coumadin, or Plavix) until 3 days after surgery. Pain medications:  Non steroidal antiinflammatories (ibuprofen -- Advil or Motrin) seem to work best for post surgical pain. Try these first.      PUT ICE ON YOUR INCISIONS 20 MINUTES EVERY HOUR WHILE THEY REMAIN SORE. PLACE A CLOTH BETWEEN YOUR SKIN THE THE ICE BAG. A narcotic prescription will also be given for breakthrough pain. Tylenol can be used in place of the narcotic, but do not take both at the same time. Colace or Miralax should be used twice daily to prevent constipation while on narcotics. If you are still having trouble having a BM after 1-2 days, try milk of magnesia. If this does not work within 24 hours, try a bottle of magnesium citrate. If this does not work, call us. Narcotics and anesthesia sometimes cause nausea and vomiting. If persistent please call the office. Do not hesitate to call with questions or concerns. Seble Wright MD  Tel [phone]  Fax [phone]  TO PREVENT AN INFECTION      1. [address] YOUR HANDS     To prevent infection, good handwashing is the most important thing you or your caregiver can do.  Wash your hands with soap and water or use the hand  we gave you before you touch any wounds. 2. SHOWER     Use the antibacterial soap we gave you when you take a shower.  Shower with this soap until your wounds are healed.  To reach all areas of your body, you may need someone to help you.  Dont forget to clean your belly button with every shower. 3.  USE CLEAN SHEETS     Use freshly cleaned sheets on your bed after surgery.  To keep the surgery site clean, do not allow pets to sleep with you while your wound is still healing.      4. STOP SMOKING     Stop smoking, or at least cut back on smoking     Smoking slows your healing. 5.  CONTROL YOUR BLOOD SUGAR     High blood sugars slow wound healing. If you are diabetic, control your blood sugar levels before and after your surgery. How to Care for Your Wound After Its Treated With  DERMABOND* Topical Skin Adhesive  DERMABOND* Topical Skin Adhesive (2-octyl cyanoacrylate) is a sterile, liquid skin adhesive  that holds wound edges together. The film will usually remain in place for 5 to 10 days, then  naturally fall off your skin. The following will answer some of your questions and provide instructions for proper care for your  wound while it is healing:    CHECK WOUND APPEARANCE   Some swelling, redness, and pain are common with all wounds and normally will go away as the  wound heals. If swelling, redness, or pain increases or if the wound feels warm to the touch,  contact a doctor. Also contact a doctor if the wound edges reopen or separate. REPLACE BANDAGES   If your wound is bandaged, keep the bandage dry.  Replace the dressing daily until the adhesive film has fallen off or if the  bandage should become wet, unless otherwise instructed by your  physician.  When changing the dressing, do not place tape directly over the  DERMABOND adhesive film, because removing the tape later may also  remove the film. AVOID TOPICAL MEDICATIONS   Do not apply liquid or ointment medications or any other product to your wound while the  DERMABOND adhesive film is in place. These may loosen the film before your wound is healed. KEEP WOUND DRY AND PROTECTED   You may occasionally and briefly wet your wound in the shower or bath. Do not soak or scrub  your wound, do not swim, and avoid periods of heavy perspiration until the DERMABOND  adhesive has naturally fallen off. After showering or bathing, gently blot your wound dry with a  soft towel.  If a protective dressing is being used, apply a fresh, dry bandage, being sure to keep  the tape off the DERMABOND adhesive film.  Apply a clean, dry bandage over the wound if necessary to protect it.  Protect your wound from injury until the skin has had sufficient time to heal.   Do not scratch, rub, or pick at the DERMABOND adhesive film. This may loosen the film before  your wound is healed.  Protect the wound from prolonged exposure to sunlight or tanning lamps while the film is in  place. If you have any questions or concerns about this product, please consult your doctor. *Trademark ©[employer] 2002DISCHARGE SUMMARY from Nurse    The following personal items are in your possession at time of discharge:    Dental Appliances: None  Visual Aid: None  Home Medications: None  Jewelry: None  Clothing: None (left in in pt. room)  Other Valuables: None             PATIENT INSTRUCTIONS:    After general anesthesia or intravenous sedation, for 24 hours or while taking prescription Narcotics:  Limit your activities  Do not drive and operate hazardous machinery  Do not make important personal or business decisions  Do  not drink alcoholic beverages  If you have not urinated within 8 hours after discharge, please contact your surgeon on call. Report the following to your surgeon:  Excessive pain, swelling, redness or odor of or around the surgical area  Temperature over 100.5  Nausea and vomiting lasting longer than 4 hours or if unable to take medications  Any signs of decreased circulation or nerve impairment to extremity: change in color, persistent  numbness, tingling, coldness or increase pain  Any questions    To prevent infection remember to refer to your handout on handwashing given to you by your nurse. *  Please give a list of your current medications to your Primary Care Provider. *  Please update this list whenever your medications are discontinued, doses are      changed, or new medications (including over-the-counter products) are added.     *  Please carry medication information at all times in case of emergency situations. These are general instructions for a healthy lifestyle:    No smoking/ No tobacco products/ Avoid exposure to second hand smoke    Surgeon General's Warning:  Quitting smoking now greatly reduces serious risk to your health. Obesity, smoking, and sedentary lifestyle greatly increases your risk for illness    A healthy diet, regular physical exercise & weight monitoring are important for maintaining a healthy lifestyle    You may be retaining fluid if you have a history of heart failure or if you experience any of the following symptoms:  Weight gain of 3 pounds or more overnight or 5 pounds in a week, increased swelling in our hands or feet or shortness of breath while lying flat in bed. Please call your doctor as soon as you notice any of these symptoms; do not wait until your next office visit. Recognize signs and symptoms of STROKE:    F-face looks uneven    A-arms unable to move or move unevenly    S-speech slurred or non-existent    T-time-call 911 as soon as signs and symptoms begin-DO NOT go       Back to bed or wait to see if you get better-TIME IS BRAIN. The discharge information has been reviewed with the patient. The patient verbalized understanding. Patient Education      Hydrocodone/Acetaminophen (Vicodin, Norco, Lortab) - (By mouth)   Why this medicine is used:   Treats pain.   Contact a nurse or doctor right away if you have:  Blistering, peeling, red skin rash  Fast or slow heartbeat, shallow breathing, blue lips, fingernails, or skin  Anxiety, restlessness, muscle spasms, twitching, seeing or hearing things that are not there  Dark urine or pale stools, yellow skin or eyes  Extreme weakness, sweating, seizures, cold or clammy skin  Lightheadedness, dizziness, fainting, fever, sweating     Common side effects:  Constipation, nausea, vomiting, loss of appetite, stomach pain  Tiredness or sleepiness  © 2017 [facility] Information is for End User's use only and may not be sold, redistributed or otherwise used for commercial purposes.
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
## 2020-05-28 NOTE — PERIOP NOTES
Patient Covid 19 test done 5/24/2020 and was negative, patient stated he has self quarantined.  Patient reports no signs or symptoms
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
## 2020-05-28 NOTE — ANESTHESIA PREPROCEDURE EVALUATION
Relevant Problems   No relevant active problems       Anesthetic History   No history of anesthetic complications            Review of Systems / Medical History  Patient summary reviewed, nursing notes reviewed and pertinent labs reviewed    Pulmonary        Sleep apnea: No treatment    Asthma        Neuro/Psych   Within defined limits           Cardiovascular  Within defined limits                Exercise tolerance: >4 METS     GI/Hepatic/Renal  Within defined limits              Endo/Other        Morbid obesity     Other Findings              Physical Exam    Airway  Mallampati: II  TM Distance: 4 - 6 cm  Neck ROM: normal range of motion   Mouth opening: Normal     Cardiovascular  Regular rate and rhythm,  S1 and S2 normal,  no murmur, click, rub, or gallop             Dental  No notable dental hx       Pulmonary  Breath sounds clear to auscultation               Abdominal  GI exam deferred       Other Findings            Anesthetic Plan    ASA: 3  Anesthesia type: general            Anesthetic plan and risks discussed with: Patient

## 2020-05-28 NOTE — ANESTHESIA POSTPROCEDURE EVALUATION
Procedure(s):  ROBOTIC REPAIR INCISIONAL HERNIA, TWO PORT SITE HERNIA's WITH MESH. general    Anesthesia Post Evaluation      Multimodal analgesia: multimodal analgesia used between 6 hours prior to anesthesia start to PACU discharge  Patient location during evaluation: PACU  Note status: Adequate. Level of consciousness: responsive to verbal stimuli and sleepy but conscious  Pain management: satisfactory to patient  Airway patency: patent  Anesthetic complications: no  Cardiovascular status: acceptable  Respiratory status: acceptable  Hydration status: acceptable  Comments: +Post-Anesthesia Evaluation and Assessment    Patient: Kristin Aguillon MRN: [id number]  SSN: [id number]   YOB: 1965  Age: 54 y.o. Sex: male      Cardiovascular Function/Vital Signs    /68 (BP 1 Location: Right arm, BP Patient Position: At rest;Head of bed elevated (Comment degrees))   Pulse 85   Temp 36.9 °C (98.4 °F)   Resp 25   Ht 5' 7.5\" (1.715 m)   Wt 111 kg (244 lb 11.4 oz)   SpO2 93%   BMI 37.76 kg/m²     Patient is status post Procedure(s):  ROBOTIC REPAIR INCISIONAL HERNIA, TWO PORT SITE HERNIA's WITH MESH. Nausea/Vomiting: Controlled. Postoperative hydration reviewed and adequate. Pain:  Pain Scale 1: Numeric (0 - 10) (05/28/20 1100)  Pain Intensity 1: 5 (05/28/20 1043)   Managed. Neurological Status:   Neuro (WDL): Exceptions to WDL (05/28/20 0934)   At baseline. Mental Status and Level of Consciousness: Arousable. Pulmonary Status:   O2 Device: Room air (05/28/20 1100)   Adequate oxygenation and airway patent. Complications related to anesthesia: None    Post-anesthesia assessment completed. No concerns.     Signed By: Steven Domínguez MD    5/28/2020  Post anesthesia nausea and vomiting:  controlled  Final Post Anesthesia Temperature Assessment:  Normothermia (36.0-37.5 degrees C)      INITIAL Post-op Vital signs:   Vitals Value Taken Time   /78 5/28/2020 11:00 AM   Temp 36.9 °C (98.4 °F) 5/28/2020 10:45 AM   Pulse 88 5/28/2020 11:06 AM   Resp 18 5/28/2020 11:06 AM   SpO2 93 % 5/28/2020 11:06 AM   Vitals shown include unvalidated device data.

## 2020-06-03 ENCOUNTER — TELEPHONE (OUTPATIENT)
Dept: SURGERY | Age: 55
End: 2020-06-03

## 2020-06-04 DIAGNOSIS — K43.2 INCISIONAL HERNIA, WITHOUT OBSTRUCTION OR GANGRENE: Primary | ICD-10-CM

## 2020-06-04 RX ORDER — HYDROCODONE BITARTRATE AND ACETAMINOPHEN 5; 325 MG/1; MG/1
1 TABLET ORAL
Qty: 20 TAB | Refills: 0 | Status: SHIPPED | OUTPATIENT
Start: 2020-06-04 | End: 2020-06-09

## 2020-06-17 ENCOUNTER — TELEPHONE (OUTPATIENT)
Dept: SURGERY | Age: 55
End: 2020-06-17

## 2020-06-17 ENCOUNTER — HOSPITAL ENCOUNTER (EMERGENCY)
Age: 55
Discharge: HOME | End: 2020-06-17
Attending: EMERGENCY MEDICINE
Payer: MEDICAID

## 2020-06-17 ENCOUNTER — APPOINTMENT (OUTPATIENT)
Dept: CT IMAGING | Age: 55
End: 2020-06-17
Attending: PHYSICIAN ASSISTANT
Payer: MEDICAID

## 2020-06-17 VITALS
TEMPERATURE: 98.2 F | HEART RATE: 84 BPM | OXYGEN SATURATION: 96 % | RESPIRATION RATE: 18 BRPM | DIASTOLIC BLOOD PRESSURE: 89 MMHG | SYSTOLIC BLOOD PRESSURE: 141 MMHG

## 2020-06-17 DIAGNOSIS — G89.18 POST-OPERATIVE PAIN: ICD-10-CM

## 2020-06-17 DIAGNOSIS — R10.84 ABDOMINAL PAIN, GENERALIZED: Primary | ICD-10-CM

## 2020-06-17 LAB
ALBUMIN SERPL-MCNC: 3.7 G/DL (ref 3.5–5)
ALBUMIN/GLOB SERPL: 1.1 {RATIO} (ref 1.1–2.2)
ALP SERPL-CCNC: 76 U/L (ref 45–117)
ALT SERPL-CCNC: 43 U/L (ref 12–78)
ANION GAP SERPL CALC-SCNC: 3 MMOL/L (ref 5–15)
AST SERPL-CCNC: 20 U/L (ref 15–37)
BASOPHILS # BLD: 0 K/UL (ref 0–0.1)
BASOPHILS NFR BLD: 1 % (ref 0–1)
BILIRUB SERPL-MCNC: 0.3 MG/DL (ref 0.2–1)
BUN SERPL-MCNC: 22 MG/DL (ref 6–20)
BUN/CREAT SERPL: 22 (ref 12–20)
CALCIUM SERPL-MCNC: 8.1 MG/DL (ref 8.5–10.1)
CHLORIDE SERPL-SCNC: 105 MMOL/L (ref 97–108)
CO2 SERPL-SCNC: 30 MMOL/L (ref 21–32)
CREAT SERPL-MCNC: 1 MG/DL (ref 0.7–1.3)
DIFFERENTIAL METHOD BLD: ABNORMAL
EOSINOPHIL # BLD: 0.3 K/UL (ref 0–0.4)
EOSINOPHIL NFR BLD: 6 % (ref 0–7)
ERYTHROCYTE [DISTWIDTH] IN BLOOD BY AUTOMATED COUNT: 12.6 % (ref 11.5–14.5)
GLOBULIN SER CALC-MCNC: 3.4 G/DL (ref 2–4)
GLUCOSE SERPL-MCNC: 129 MG/DL (ref 65–100)
HCT VFR BLD AUTO: 41.9 % (ref 36.6–50.3)
HGB BLD-MCNC: 14.4 G/DL (ref 12.1–17)
IMM GRANULOCYTES # BLD AUTO: 0 K/UL (ref 0–0.04)
IMM GRANULOCYTES NFR BLD AUTO: 1 % (ref 0–0.5)
LYMPHOCYTES # BLD: 1.4 K/UL (ref 0.8–3.5)
LYMPHOCYTES NFR BLD: 26 % (ref 12–49)
MCH RBC QN AUTO: 30.4 PG (ref 26–34)
MCHC RBC AUTO-ENTMCNC: 34.4 G/DL (ref 30–36.5)
MCV RBC AUTO: 88.4 FL (ref 80–99)
MONOCYTES # BLD: 0.5 K/UL (ref 0–1)
MONOCYTES NFR BLD: 9 % (ref 5–13)
NEUTS SEG # BLD: 3.2 K/UL (ref 1.8–8)
NEUTS SEG NFR BLD: 57 % (ref 32–75)
NRBC # BLD: 0 K/UL (ref 0–0.01)
NRBC BLD-RTO: 0 PER 100 WBC
PLATELET # BLD AUTO: 219 K/UL (ref 150–400)
PMV BLD AUTO: 9.4 FL (ref 8.9–12.9)
POTASSIUM SERPL-SCNC: 3.7 MMOL/L (ref 3.5–5.1)
PROT SERPL-MCNC: 7.1 G/DL (ref 6.4–8.2)
RBC # BLD AUTO: 4.74 M/UL (ref 4.1–5.7)
SODIUM SERPL-SCNC: 138 MMOL/L (ref 136–145)
WBC # BLD AUTO: 5.6 K/UL (ref 4.1–11.1)

## 2020-06-17 PROCEDURE — 74011636320 HC RX REV CODE- 636/320: Performed by: EMERGENCY MEDICINE

## 2020-06-17 PROCEDURE — 36415 COLL VENOUS BLD VENIPUNCTURE: CPT

## 2020-06-17 PROCEDURE — 80053 COMPREHEN METABOLIC PANEL: CPT

## 2020-06-17 PROCEDURE — 99284 EMERGENCY DEPT VISIT MOD MDM: CPT

## 2020-06-17 PROCEDURE — 96374 THER/PROPH/DIAG INJ IV PUSH: CPT

## 2020-06-17 PROCEDURE — 74011250636 HC RX REV CODE- 250/636: Performed by: PHYSICIAN ASSISTANT

## 2020-06-17 PROCEDURE — 85025 COMPLETE CBC W/AUTO DIFF WBC: CPT

## 2020-06-17 PROCEDURE — 74177 CT ABD & PELVIS W/CONTRAST: CPT

## 2020-06-17 RX ORDER — SODIUM CHLORIDE 0.9 % (FLUSH) 0.9 %
10 SYRINGE (ML) INJECTION
Status: COMPLETED | OUTPATIENT
Start: 2020-06-17 | End: 2020-06-17

## 2020-06-17 RX ORDER — FENTANYL CITRATE 50 UG/ML
50 INJECTION, SOLUTION INTRAMUSCULAR; INTRAVENOUS
Status: DISCONTINUED | OUTPATIENT
Start: 2020-06-17 | End: 2020-06-17 | Stop reason: HOSPADM

## 2020-06-17 RX ORDER — HYDROCODONE BITARTRATE AND ACETAMINOPHEN 5; 325 MG/1; MG/1
1 TABLET ORAL
Qty: 12 TAB | Refills: 0 | Status: SHIPPED | OUTPATIENT
Start: 2020-06-17 | End: 2020-06-19 | Stop reason: SDUPTHER

## 2020-06-17 RX ADMIN — IOPAMIDOL 100 ML: 755 INJECTION, SOLUTION INTRAVENOUS at 19:47

## 2020-06-17 RX ADMIN — Medication 10 ML: at 19:47

## 2020-06-17 RX ADMIN — FENTANYL CITRATE 50 MCG: 50 INJECTION INTRAMUSCULAR; INTRAVENOUS at 19:29

## 2020-06-17 NOTE — ED NOTES
1915: Bedside shift change report given to SILAS Fofana (oncoming nurse) by Contreras Franco RN (offgoing nurse). Report included the following information SBAR, Kardex, ED Summary, Intake/Output, MAR and Recent Results. 2207: Patient to CT at this time. 2040: SIERRA Coley has reviewed discharge instructions with the patient. The patient verbalized understanding. Patient ambulatory out of ED at this time.
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
## 2020-06-19 ENCOUNTER — OFFICE VISIT (OUTPATIENT)
Dept: SURGERY | Age: 55
End: 2020-06-19

## 2020-06-19 VITALS
WEIGHT: 254 LBS | BODY MASS INDEX: 38.49 KG/M2 | OXYGEN SATURATION: 98 % | DIASTOLIC BLOOD PRESSURE: 104 MMHG | HEIGHT: 68 IN | RESPIRATION RATE: 18 BRPM | TEMPERATURE: 97.5 F | HEART RATE: 89 BPM | SYSTOLIC BLOOD PRESSURE: 185 MMHG

## 2020-06-19 DIAGNOSIS — Z09 POSTOPERATIVE EXAMINATION: Primary | ICD-10-CM

## 2020-06-19 DIAGNOSIS — G89.18 POST-OPERATIVE PAIN: ICD-10-CM

## 2020-06-19 DIAGNOSIS — R10.84 ABDOMINAL PAIN, GENERALIZED: ICD-10-CM

## 2020-06-19 DIAGNOSIS — E66.01 SEVERE OBESITY (HCC): ICD-10-CM

## 2020-06-19 RX ORDER — HYDROCODONE BITARTRATE AND ACETAMINOPHEN 5; 325 MG/1; MG/1
1 TABLET ORAL
Qty: 15 TAB | Refills: 0 | Status: SHIPPED | OUTPATIENT
Start: 2020-06-19 | End: 2020-06-24

## 2020-06-19 NOTE — PROGRESS NOTES
To: Stephany Hernandez PA-C    From: Junoir Garcia MD    Thank you for referring Jett Shah. Encounter Date: 6/19/2020    Subjective:      Jett Shah is a 54 y.o. male presents for postop care. Complains of burning pain at the RLQ incision. Went to ER and had CT 2 days ago. Everything looked fine. He never did finish the 800mg ibuprofen I prescribed after surgery. He has completed his Norco.    Eating a regular diet without difficulty. Bowel movements are regular. Objective:     General:  alert, cooperative, no distress, appears stated age   Abdomen: soft, bowel sounds active, non-tender   Incision(s):  healing well, no drainage, no erythema, repair intact with vigorous valsalva. I looked with ultrasound and there is typical acute inflammation but no other explanation for his pain. Assessment:     S/p robotic incisional hernia repair. Incisional pain. Plan:     Told him the ibuprofen work better for inflammatory pain than the Norco but I also wrote for a refill of that. Reminded of activity restrictions documented on discharge instructions. RTC 2 weeks.       Junior Garcia MD

## 2020-06-19 NOTE — LETTER
6/19/20 Patient: Sheba Rojas YOB: 1965 Date of Visit: 6/19/2020 Ryan Fernández PA-C 
[address] [place] [address] [phone] VIA Facsimile: [phone] Thank you for referring Mr. Sheba Rojas for evaluation. I enjoyed meeting him. Please feel free to call or text me regarding this patient, or for any surgical questions. My cell is [phone]. Best regards, 
 
Michelle Blum MD, FACS [facility] [place], a division of [facility], [facility]

## 2020-08-31 ENCOUNTER — TELEPHONE (OUTPATIENT)
Dept: SURGERY | Age: 55
End: 2020-08-31

## 2020-08-31 NOTE — TELEPHONE ENCOUNTER
PT IS AT 'S OFFICE NEEDS A NOTE FAXED OVER STATING HE IS RELEASED FROM OUR [facility] TO WORK WITH NO RESTRICTIONS FROM SURGERY FAX NUMBER [phone] AZRA KIRAN GETTING [employer] PHYSICAL DONE

## 2020-09-01 ENCOUNTER — DOCUMENTATION ONLY (OUTPATIENT)
Dept: SURGERY | Age: 55
End: 2020-09-01

## 2022-03-18 PROBLEM — K43.6 VENTRAL HERNIA WITH OBSTRUCTION AND WITHOUT GANGRENE: Status: ACTIVE | Noted: 2020-02-19

## 2022-03-18 PROBLEM — E66.09 CLASS 2 OBESITY DUE TO EXCESS CALORIES WITH BODY MASS INDEX (BMI) OF 36.0 TO 36.9 IN ADULT: Status: ACTIVE | Noted: 2020-02-19

## 2022-03-19 PROBLEM — E66.01 SEVERE OBESITY (HCC): Status: ACTIVE | Noted: 2020-06-19

## 2022-03-19 PROBLEM — K43.2 INCISIONAL HERNIA, WITHOUT OBSTRUCTION OR GANGRENE: Status: ACTIVE | Noted: 2020-05-28

## 2022-05-24 ENCOUNTER — TRANSCRIBE ORDER (OUTPATIENT)
Dept: SCHEDULING | Age: 57
End: 2022-05-24

## 2022-05-24 DIAGNOSIS — R06.02 SOB (SHORTNESS OF BREATH): Primary | ICD-10-CM

## 2022-05-24 DIAGNOSIS — R55 SYNCOPE: ICD-10-CM

## 2023-07-25 ENCOUNTER — HOSPITAL ENCOUNTER (OUTPATIENT)
Facility: HOSPITAL | Age: 58
Discharge: HOME | End: 2023-07-28
Attending: INTERNAL MEDICINE
Payer: COMMERCIAL

## 2023-07-25 DIAGNOSIS — R06.02 SHORTNESS OF BREATH: ICD-10-CM

## 2023-07-25 PROCEDURE — 71250 CT THORAX DX C-: CPT

## 2023-08-31 ENCOUNTER — ANESTHESIA EVENT (OUTPATIENT)
Facility: HOSPITAL | Age: 58
End: 2023-08-31
Payer: COMMERCIAL

## 2023-08-31 ENCOUNTER — ANESTHESIA (OUTPATIENT)
Facility: HOSPITAL | Age: 58
End: 2023-08-31
Payer: COMMERCIAL

## 2023-08-31 ENCOUNTER — HOSPITAL ENCOUNTER (OUTPATIENT)
Facility: HOSPITAL | Age: 58
Setting detail: OUTPATIENT SURGERY
Discharge: HOME | End: 2023-08-31
Attending: INTERNAL MEDICINE | Admitting: INTERNAL MEDICINE
Payer: COMMERCIAL

## 2023-08-31 VITALS
OXYGEN SATURATION: 95 % | BODY MASS INDEX: 39.71 KG/M2 | HEART RATE: 82 BPM | WEIGHT: 253 LBS | RESPIRATION RATE: 17 BRPM | DIASTOLIC BLOOD PRESSURE: 73 MMHG | SYSTOLIC BLOOD PRESSURE: 109 MMHG | HEIGHT: 67 IN | TEMPERATURE: 97.5 F

## 2023-08-31 LAB
APPEARANCE FLD: CLEAR
COLOR FLD: COLORLESS
NUC CELL # FLD: 1 /CU MM
RBC # FLD: >100 /CU MM
SPECIMEN SOURCE FLD: ABNORMAL

## 2023-08-31 PROCEDURE — 31622 DX BRONCHOSCOPE/WASH: CPT

## 2023-08-31 PROCEDURE — 88312 SPECIAL STAINS GROUP 1: CPT

## 2023-08-31 PROCEDURE — 87206 SMEAR FLUORESCENT/ACID STAI: CPT

## 2023-08-31 PROCEDURE — 87102 FUNGUS ISOLATION CULTURE: CPT

## 2023-08-31 PROCEDURE — 6370000000 HC RX 637 (ALT 250 FOR IP)

## 2023-08-31 PROCEDURE — 87116 MYCOBACTERIA CULTURE: CPT

## 2023-08-31 PROCEDURE — 87252 VIRUS INOCULATION TISSUE: CPT

## 2023-08-31 PROCEDURE — 7100000010 HC PHASE II RECOVERY - FIRST 15 MIN: Performed by: INTERNAL MEDICINE

## 2023-08-31 PROCEDURE — 87070 CULTURE OTHR SPECIMN AEROBIC: CPT

## 2023-08-31 PROCEDURE — 3600007512: Performed by: INTERNAL MEDICINE

## 2023-08-31 PROCEDURE — 2580000003 HC RX 258: Performed by: INTERNAL MEDICINE

## 2023-08-31 PROCEDURE — 3600007502: Performed by: INTERNAL MEDICINE

## 2023-08-31 PROCEDURE — 88313 SPECIAL STAINS GROUP 2: CPT

## 2023-08-31 PROCEDURE — 7100000011 HC PHASE II RECOVERY - ADDTL 15 MIN: Performed by: INTERNAL MEDICINE

## 2023-08-31 PROCEDURE — 88112 CYTOPATH CELL ENHANCE TECH: CPT

## 2023-08-31 PROCEDURE — 87205 SMEAR GRAM STAIN: CPT

## 2023-08-31 PROCEDURE — 89050 BODY FLUID CELL COUNT: CPT

## 2023-08-31 PROCEDURE — 3700000000 HC ANESTHESIA ATTENDED CARE: Performed by: INTERNAL MEDICINE

## 2023-08-31 PROCEDURE — 31645 BRNCHSC W/THER ASPIR 1ST: CPT

## 2023-08-31 PROCEDURE — 2500000003 HC RX 250 WO HCPCS

## 2023-08-31 PROCEDURE — 31624 DX BRONCHOSCOPE/LAVAGE: CPT

## 2023-08-31 PROCEDURE — 6360000002 HC RX W HCPCS: Performed by: NURSE ANESTHETIST, CERTIFIED REGISTERED

## 2023-08-31 PROCEDURE — 3700000001 HC ADD 15 MINUTES (ANESTHESIA): Performed by: INTERNAL MEDICINE

## 2023-08-31 RX ORDER — LIDOCAINE HYDROCHLORIDE 10 MG/ML
INJECTION, SOLUTION INFILTRATION; PERINEURAL
Status: DISCONTINUED
Start: 2023-08-31 | End: 2023-08-31 | Stop reason: HOSPADM

## 2023-08-31 RX ORDER — SODIUM CHLORIDE 9 MG/ML
INJECTION, SOLUTION INTRAVENOUS CONTINUOUS
Status: DISCONTINUED | OUTPATIENT
Start: 2023-08-31 | End: 2023-08-31 | Stop reason: HOSPADM

## 2023-08-31 RX ORDER — SODIUM CHLORIDE 9 MG/ML
25 INJECTION, SOLUTION INTRAVENOUS PRN
Status: DISCONTINUED | OUTPATIENT
Start: 2023-08-31 | End: 2023-08-31 | Stop reason: HOSPADM

## 2023-08-31 RX ORDER — LIDOCAINE HYDROCHLORIDE 20 MG/ML
INJECTION, SOLUTION EPIDURAL; INFILTRATION; INTRACAUDAL; PERINEURAL
Status: COMPLETED
Start: 2023-08-31 | End: 2023-08-31

## 2023-08-31 RX ORDER — SODIUM CHLORIDE 0.9 % (FLUSH) 0.9 %
5-40 SYRINGE (ML) INJECTION EVERY 12 HOURS SCHEDULED
Status: DISCONTINUED | OUTPATIENT
Start: 2023-08-31 | End: 2023-08-31 | Stop reason: HOSPADM

## 2023-08-31 RX ORDER — AMLODIPINE BESYLATE 5 MG/1
5 TABLET ORAL DAILY
COMMUNITY

## 2023-08-31 RX ORDER — SODIUM CHLORIDE 0.9 % (FLUSH) 0.9 %
5-40 SYRINGE (ML) INJECTION PRN
Status: CANCELLED | OUTPATIENT
Start: 2023-08-31

## 2023-08-31 RX ORDER — SODIUM CHLORIDE 0.9 % (FLUSH) 0.9 %
5-40 SYRINGE (ML) INJECTION PRN
Status: DISCONTINUED | OUTPATIENT
Start: 2023-08-31 | End: 2023-08-31 | Stop reason: HOSPADM

## 2023-08-31 RX ORDER — SODIUM CHLORIDE 0.9 % (FLUSH) 0.9 %
5-40 SYRINGE (ML) INJECTION EVERY 12 HOURS SCHEDULED
Status: CANCELLED | OUTPATIENT
Start: 2023-08-31

## 2023-08-31 RX ORDER — ALBUTEROL SULFATE 90 UG/1
AEROSOL, METERED RESPIRATORY (INHALATION)
COMMUNITY
Start: 2023-07-10

## 2023-08-31 RX ORDER — SODIUM CHLORIDE 9 MG/ML
INJECTION, SOLUTION INTRAVENOUS PRN
Status: CANCELLED | OUTPATIENT
Start: 2023-08-31

## 2023-08-31 RX ADMIN — PROPOFOL 100 MG: 10 INJECTION, EMULSION INTRAVENOUS at 08:37

## 2023-08-31 RX ADMIN — PROPOFOL 50 MG: 10 INJECTION, EMULSION INTRAVENOUS at 08:45

## 2023-08-31 RX ADMIN — LIDOCAINE HYDROCHLORIDE 10 ML: 20 INJECTION, SOLUTION EPIDURAL; INFILTRATION; INTRACAUDAL; PERINEURAL at 08:48

## 2023-08-31 RX ADMIN — PROPOFOL 50 MG: 10 INJECTION, EMULSION INTRAVENOUS at 08:41

## 2023-08-31 RX ADMIN — SODIUM CHLORIDE: 9 INJECTION, SOLUTION INTRAVENOUS at 08:33

## 2023-08-31 RX ADMIN — PROPOFOL 50 MG: 10 INJECTION, EMULSION INTRAVENOUS at 08:43

## 2023-08-31 RX ADMIN — BENZOCAINE 1 EACH: 200 SPRAY DENTAL; ORAL; PERIODONTAL at 08:52

## 2023-08-31 RX ADMIN — PROPOFOL 50 MG: 10 INJECTION, EMULSION INTRAVENOUS at 08:39

## 2023-08-31 ASSESSMENT — PAIN DESCRIPTION - PAIN TYPE: TYPE: CHRONIC PAIN

## 2023-08-31 ASSESSMENT — PAIN DESCRIPTION - LOCATION: LOCATION: FLANK

## 2023-08-31 ASSESSMENT — PAIN DESCRIPTION - ORIENTATION: ORIENTATION: RIGHT

## 2023-08-31 ASSESSMENT — PAIN DESCRIPTION - DESCRIPTORS: DESCRIPTORS: ACHING

## 2023-08-31 ASSESSMENT — PAIN DESCRIPTION - FREQUENCY: FREQUENCY: CONTINUOUS

## 2023-08-31 ASSESSMENT — PAIN - FUNCTIONAL ASSESSMENT: PAIN_FUNCTIONAL_ASSESSMENT: NONE - DENIES PAIN

## 2023-08-31 ASSESSMENT — PAIN DESCRIPTION - ONSET: ONSET: ON-GOING

## 2023-08-31 ASSESSMENT — PAIN SCALES - GENERAL: PAINLEVEL_OUTOF10: 7

## 2023-08-31 NOTE — ANESTHESIA POSTPROCEDURE EVALUATION
Department of Anesthesiology  Postprocedure Note    Patient: Jonna Santacruz  MRN: [id number]  YOB: 1965  Date of evaluation: 8/31/2023      Procedure Summary     Date: 08/31/23 Room / Location: [facility] ENDO 01 / [facility] ENDOSCOPY    Anesthesia Start: 8656 Anesthesia Stop: 1897    Procedure: DIAGNOSTIC BRONCHOSCOPY (Bronchus) Diagnosis:       Shortness of breath      (Shortness of breath [R06.02])    Surgeons: Helio Tolbert MD Responsible Provider: Ro Laura MD    Anesthesia Type: general ASA Status: 2          Anesthesia Type: No value filed.     Brianna Phase I: Brianna Score: 10    Brianna Phase II: Brianna Score: 10      Anesthesia Post Evaluation    Patient location during evaluation: PACU  Patient participation: complete - patient participated  Level of consciousness: awake  Pain score: 2  Airway patency: patent  Nausea & Vomiting: no nausea  Complications: no  Cardiovascular status: blood pressure returned to baseline  Respiratory status: acceptable  Hydration status: euvolemic  Pain management: adequate

## 2023-08-31 NOTE — PROGRESS NOTES
Respiratory Therapy  Bronchoscopy Note    Name: Luiz Goyal MRN: [id number]   : 1965     Date: 2023            Procedure: Diagnostic bronchoscopy. Scope ID: [id number]      Consent/Treatment: Timeout verified the correct patient and correct procedure. Anesthesia:   Moderate conscious sedation performed by anesthesiology    Kathy Syed MD  Respiratory [address] [place] [address],   Nurse:Bre Burr RN     Specimens:    The bronchoscope was wedged in the RML and bronchoalveolar lavage was performed; material was sent for  microbiology, cytology, AFB smear and culture, and fungal culture      Complications: none        Alan Katz,   2023  9:51 AM

## 2023-08-31 NOTE — H&P
Pulmonary    63-year-old male followed in the office for shortness of breath. Last seen August 1, 2023. No significant changes. At that time he had been experiencing shortness of breath without improvement with inhaled medications. CT scan of the chest did not show significant abnormalities. Pulmonary function studies showed severe airflow obstruction without rhonchi dilator responsiveness. There is mild restriction. The flow volume loop showed flattening of the inspiratory and expiratory limb suggestive of fixed airflow obstruction. Due to patient's symptoms and flow-volume loop findings decision was made for airway inspection by bronchoscopy    Allergies   Allergen Reactions    Oxycodone-Acetaminophen Other (See Comments)     Headache     Past Medical History:   Diagnosis Date    Asthma     Sleep apnea     doesnt wear cpap     Past Surgical History:   Procedure Laterality Date    HERNIA REPAIR  05/28/2020    ORTHOPEDIC SURGERY  2019    hip/Dr Kaleigh Castañeda    UROLOGICAL  2019    kidney repair/Dr Melanie Pimentel     Prior to Admission medications    Medication Sig Start Date End Date Taking? Authorizing Provider   ibuprofen (ADVIL;MOTRIN) 800 MG tablet Take 800 mg by mouth 3 times daily (with meals) 2/17/20   Ar Automatic Reconciliation   polyethylene glycol (GLYCOLAX) 17 GM/SCOOP powder Take 17 g by mouth daily 5/28/20   Ar Automatic Reconciliation   traZODone (DESYREL) 50 MG tablet ceived the following from [address] [facility]: Outside name: traZODone (DESYREL) 50 mg tablet 2/17/20   Ar Automatic Reconciliation   zolpidem (AMBIEN) 5 MG tablet Take by mouth.     Ar Automatic Reconciliation     No distress / alert / no accessory muscle use    Pulmonary impression:  Shortness of breath with abnormal pulmonary function showing suggestion of fixed airflow obstruction    Plan:  Airway inspection by fiberoptic bronchoscopy under MAC anesthesia to evaluate for vocal cord paralysis or dysfunction subglottic stenosis or

## 2023-08-31 NOTE — OP NOTE
Operative Note      Patient: Jannette Hernandez  YOB: 1965  MRN: [id number]    Date of Procedure: 8/31/2023    Pre-Op Diagnosis Codes: * Shortness of breath [R06.02]    Post-Op Diagnosis: Post-Op Diagnosis Codes: * Shortness of breath [R06.02]  Subglottic stenosis  GERD       Procedure(s):  DIAGNOSTIC BRONCHOSCOPY    Surgeon(s):  Deya Arroyo MD    Assistant:   Tanya Magaña RT    Anesthesia: Monitor Anesthesia Care    Estimated Blood Loss (mL): Minimal    Complications: None    Specimens:   BAL x 3 RML    Implants:  * No implants in log *      Drains: * No LDAs found *    Findings: Subglottic narrowing of trachea with red inflamed lower airways        Detailed Description of Procedure:   Consent obtained  Timeout performed  Propofol anesthesia per anesthesia team  Fiberoptic bronchoscope passed through the mouth with bite block   Upper airway was mildly reddened and edematous  Vocal cords move symmetrically   Scope was passed into the trachea. Just distal to the vocal cords there was narrowing of the trachea suggestive of subglottic stenosis. The lower airways were read and inflamed no obvious endobronchial lesion seen.   Bronchoalveolar lavage was performed in the right middle lobe x3 with good return of slightly cloudy fluid  Samples were sent for microbiology and cytology studies including silver and Oil Red O stains  There were no complications  Patient tolerated procedure well    Electronically signed by Art Officer, MD on 8/31/2023 at 8:49 AM

## 2023-08-31 NOTE — DISCHARGE SUMMARY
Pulmonary    S/p bronchoscopy with BAL under mac    Findings suggestive of reflux and subglottic stenosis    BAL x 3 in L    Follow up in office as scheduled to review results    Will plan to treat for gerd and have patient evaluated at [facility] due to presence of airway narrowing in the subglottic region    Ahmet Pradhan MD

## 2023-09-01 LAB
ACID FAST STN SPEC: NEGATIVE
MYCOBACTERIUM SPEC QL CULT: NORMAL
SPECIMEN PREPARATION: NORMAL
SPECIMEN SOURCE: NORMAL

## 2023-09-02 LAB
BACTERIA SPEC CULT: NORMAL
GRAM STN SPEC: NORMAL
SERVICE CMNT-IMP: NORMAL

## 2023-09-09 LAB
BACTERIA SPEC CULT: NORMAL
SERVICE CMNT-IMP: NORMAL

## 2023-09-10 LAB
SPECIMEN SOURCE: NORMAL
VIRUS SPEC CULT: NORMAL

## 2023-09-11 LAB
BACTERIA SPEC CULT: NORMAL
SERVICE CMNT-IMP: NORMAL

## 2023-09-18 LAB
BACTERIA SPEC CULT: NORMAL
SERVICE CMNT-IMP: NORMAL

## 2023-09-25 LAB
BACTERIA SPEC CULT: NORMAL
SERVICE CMNT-IMP: NORMAL

## 2023-10-02 LAB
BACTERIA SPEC CULT: NORMAL
SERVICE CMNT-IMP: NORMAL

## 2023-10-13 LAB
ACID FAST STN SPEC: NEGATIVE
MYCOBACTERIUM SPEC QL CULT: NEGATIVE
SPECIMEN PREPARATION: NORMAL
SPECIMEN SOURCE: NORMAL

## 2023-12-30 LAB
ACID FAST STN SPEC: NEGATIVE
MYCOBACTERIUM SPEC QL CULT: NEGATIVE
SPECIMEN PREPARATION: NORMAL
SPECIMEN SOURCE: NORMAL
SPECIMEN SOURCE: NORMAL
VIRUS SPEC CULT: NORMAL

## (undated) DEVICE — PREP SKN PREVAIL 40ML APPL --

## (undated) DEVICE — BLADE ASSEMB CLP HAIR FINE --

## (undated) DEVICE — LAPAROSCOPIC TROCAR SLEEVE/SINGLE USE: Brand: KII® OPTICAL ACCESS SYSTEM

## (undated) DEVICE — COVER,MAYO STAND,STERILE: Brand: MEDLINE

## (undated) DEVICE — STRAP,POSITIONING,KNEE/BODY,FOAM,4X60": Brand: MEDLINE

## (undated) DEVICE — DRAPE,REIN 53X77,STERILE: Brand: MEDLINE

## (undated) DEVICE — TOWEL SURG W17XL27IN STD BLU COT NONFENESTRATED PREWASHED

## (undated) DEVICE — GARMENT,MEDLINE,DVT,INT,CALF,MED, GEN2: Brand: MEDLINE

## (undated) DEVICE — MAGNETIC IMPLANT S1000-00: Brand: SOPHONO™

## (undated) DEVICE — TROCAR SITE CLOSURE DEVICE: Brand: ENDO CLOSE

## (undated) DEVICE — STERILE POLYISOPRENE POWDER-FREE SURGICAL GLOVES: Brand: PROTEXIS

## (undated) DEVICE — NEEDLE HYPO 22GA L1.5IN BLK S STL HUB POLYPR SHLD REG BVL

## (undated) DEVICE — VISUALIZATION SYSTEM: Brand: CLEARIFY

## (undated) DEVICE — SEAL UNIV 5-8MM DISP BX/10 -- DA VINCI XI - SNGL USE

## (undated) DEVICE — TROCAR: Brand: KII® OPTICAL ACCESS SYSTEM

## (undated) DEVICE — REM POLYHESIVE ADULT PATIENT RETURN ELECTRODE: Brand: VALLEYLAB

## (undated) DEVICE — SURGICAL PROCEDURE KIT GEN LAPAROSCOPY LF

## (undated) DEVICE — 3M™ IOBAN™ 2 ANTIMICROBIAL INCISE DRAPE 6650EZ: Brand: IOBAN™ 2

## (undated) DEVICE — BINDER ABD M/L H12IN FOR 46-62IN WHT 4 SLD PNL DSGN HOOP

## (undated) DEVICE — GOWN,SIRUS,NONRNF,SETINSLV,2XL,18/CS: Brand: MEDLINE

## (undated) DEVICE — ARM DRAPE

## (undated) DEVICE — INFECTION CONTROL KIT SYS

## (undated) DEVICE — HANDLE LT SNAP ON ULT DURABLE LENS FOR TRUMPF ALC DISPOSABLE

## (undated) DEVICE — SOLUTION IRRIG 1000ML H2O STRL BLT

## (undated) DEVICE — TIP COVER ACCESSORY

## (undated) DEVICE — SUTURE MCRYL SZ 4-0 L27IN ABSRB UD L19MM PS-2 1/2 CIR PRIM Y426H

## (undated) DEVICE — (D)PREP SKN CHLRAPRP APPL 26ML -- CONVERT TO ITEM 371833

## (undated) DEVICE — SUTURE SZ 0 27IN 5/8 CIR UR-6  TAPER PT VIOLET ABSRB VICRYL J603H

## (undated) DEVICE — SUTURE DEV SZ 0 L6IN N ABSORBABLE

## (undated) DEVICE — DERMABOND SKIN ADH 0.7ML -- DERMABOND ADVANCED 12/BX

## (undated) DEVICE — BLADELESS OBTURATOR: Brand: WECK VISTA